# Patient Record
Sex: FEMALE | Race: WHITE | NOT HISPANIC OR LATINO | Employment: STUDENT | URBAN - METROPOLITAN AREA
[De-identification: names, ages, dates, MRNs, and addresses within clinical notes are randomized per-mention and may not be internally consistent; named-entity substitution may affect disease eponyms.]

---

## 2019-03-22 ENCOUNTER — HOSPITAL ENCOUNTER (EMERGENCY)
Facility: OTHER | Age: 19
Discharge: HOME | End: 2019-03-23
Attending: EMERGENCY MEDICINE

## 2019-03-22 VITALS
WEIGHT: 130 LBS | SYSTOLIC BLOOD PRESSURE: 123 MMHG | DIASTOLIC BLOOD PRESSURE: 66 MMHG | RESPIRATION RATE: 20 BRPM | BODY MASS INDEX: 20.4 KG/M2 | HEART RATE: 100 BPM | TEMPERATURE: 98 F | OXYGEN SATURATION: 98 % | HEIGHT: 67 IN

## 2019-03-22 DIAGNOSIS — F16.920: Primary | ICD-10-CM

## 2019-03-22 PROCEDURE — 25000003 PHARM REV CODE 250: Performed by: EMERGENCY MEDICINE

## 2019-03-22 PROCEDURE — 99282 EMERGENCY DEPT VISIT SF MDM: CPT

## 2019-03-22 RX ORDER — ONDANSETRON 4 MG/1
4 TABLET, ORALLY DISINTEGRATING ORAL
Status: COMPLETED | OUTPATIENT
Start: 2019-03-22 | End: 2019-03-22

## 2019-03-22 RX ADMIN — ONDANSETRON 4 MG: 4 TABLET, ORALLY DISINTEGRATING ORAL at 10:03

## 2019-03-23 NOTE — ED PROVIDER NOTES
"Encounter Date: 3/22/2019    SCRIBE #1 NOTE: I, Tony Bowden, am scribing for, and in the presence of, Dr. Helton.       History     Chief Complaint   Patient presents with    Drug Problem     Pt came to the ED tonight s/p taking LSD , pt was found unconscious by EMS on scene. pt is now aaox4 but currently hallucinating.      Time seen by provider: 10:35 PM    This is a 18 y.o. female who presents with complaint of altered mental status and loss of consciousness prior to arrival. Upon EMS arrival the patient was awake and alert. The patient's friend reports that she consumed alcohol and two tabs of LSD, while at a music festival. The patient initially complaint of hallucinations and a episode of emesis while in the ED. Her symptoms have since improved.       The history is provided by the EMS personnel and a friend.     Review of patient's allergies indicates:  No Known Allergies  History reviewed. No pertinent past medical history.  No past surgical history on file.  History reviewed. No pertinent family history.  Social History     Tobacco Use    Smoking status: Not on file   Substance Use Topics    Alcohol use: Not on file    Drug use: Not on file     ROS: As per HPI and below:   General: No fever.  HENT: No sore throat.    Eyes: No eye pain.   Cardiovascular: No chest pain.   Respiratory: No dyspnea.  GI: Emesis.  Nausea, vomiting.  No abdominal pain.    Neuro: No focal weakness. No headache. No syncope.  Musculoskeletal: No extremity pain. No swelling.    Psych: Hallucinations. No acute changes.  All other systems negative.       Physical Exam     Initial Vitals [03/22/19 2211]   BP Pulse Resp Temp SpO2   132/75 96 18 98.2 °F (36.8 °C) 100 %      MAP       --         Nursing note and vitals reviewed.  /66   Pulse 100   Temp 98.2 °F (36.8 °C) (Oral)   Resp 20   Ht 5' 7" (1.702 m)   Wt 59 kg (130 lb)   SpO2 98%   Breastfeeding? No   BMI 20.36 kg/m²   Constitutional: AAOx3. Well-developed and " well-nourished. No distress. Glitter face make-up. Bright colored body suit and fishnet top.   HENT:   Mouth/Throat: Oropharynx is clear and moist.  Eyes: EOMI. No discharge. Anicteric.  Neck: Normal range of motion. Neck supple.  Cardiovascular: Normal rate. No murmur, no gallop and no friction rub heard.   Pulmonary/Chest: No respiratory distress. Effort normal. No wheezes, no rales, no rhonchi  Abdominal: Bowel sounds normal. Soft. No distension and no mass. There is no tenderness. There is no rebound, no guarding, no tenderness at McBurney's point and negative Dent's sign.   Musculoskeletal: Normal range of motion.   Neurological: GCS 15. Alert and oriented to person, place, and time. No gross cranial nerve, light touch or strength deficit. Coordination normal.   Skin: Skin is warm and dry.   Psychiatric: Behavior is normal. Judgment normal. Not responding to internal stimuli, or having obvious hallucinations.      ED Course   Procedures  Labs Reviewed   POCT URINE PREGNANCY          Imaging Results    None          Medical Decision Making:   ED Management:  11:57 PM: Patient is able to tolerate oral intake. She denies any symptoms of intoxication. The patient states that LSD use was approximately six hours ago, which is consistent with the drugs affects.             Scribe Attestation:   Scribe #1: I performed the above scribed service and the documentation accurately describes the services I performed. I attest to the accuracy of the note.    Attending Attestation:           Physician Attestation for Scribe:  Physician Attestation Statement for Scribe #1: I, Dr. Helton, reviewed documentation, as scribed by Tony Bowden  in my presence, and it is both accurate and complete.                 ED Course as of Mar 23 0020   Fri Mar 22, 2019   2243 Patient is a 18-year-old female presents with altered mental status in the setting of taking 2 tabs of LSD, and alcohol intake while attending a music festival.   Patient had active emesis in the emergency department, and is acting appropriately.    [RC]   Sat Mar 23, 2019   0002 I discussed with patient and/or guardian/caretaker that this evaluation in the ED does not suggest any emergent or life threatening medical condition requiring admission or immediate intervention beyond what was provided in the ED. Regardless, an unremarkable evaluation in the ED does not preclude the development or presence of a serious or life threatening condition. As such, patient was instructed to return immediately for any worsening or change in current symptoms.     I had a detailed discussion with patient  and/or guardian/caretaker regarding findings, plan, return precautions, importance of medication adherence, need to follow-up with a PCP. All questions answered.     Note was created using voice recognition software. Note may have occasional typographical errors that may not have been identified and edited despite initial review prior to signing.    [RC]      ED Course User Index  [RC] Vladimir Helton MD     Clinical Impression:     1. Hallucinogen intoxication without complication                                 Vladimir Helton MD  03/23/19 0020

## 2019-03-23 NOTE — ED TRIAGE NOTES
Pt came to the ED tonight s/p having too much LSD tonight at the [id number] [place] festival . Pt was found unconscious per EMS and then was woken up with no intervention. Pt is aaox4 but is currently hallucinating. Pt has some episodes of vomiting but currently has no new complaints pt will be monitored at this time

## 2019-03-23 NOTE — DISCHARGE INSTRUCTIONS
Call your primary care doctor to make the first available appointment.     Keep all your medical appointments.     Take your regular medication as prescribed. Contact your primary care provider before running out of medication, or for any problems obtaining them.    Do not drive or operate heavy machinery while taking opioid or muscle relaxing medications. Examples include norco, percocet, xanax, valium, flexeril.     Overuse or long term use of pain and sedating medication may lead to addiction, dependence, organ failure, family and work problems, legal problems, accidental overdose and death.    If you do not have health insurance, you probably qualify for heavily discounted rates:  Call 1-175.383.4936 ([facility] hotline) or go to www.[employer].[place].gov    Your evaluation in the ED does not suggest any emergent or life threatening medical condition requiring admission or immediate intervention beyond that provided in the ED.   However, the signs of a serious problem sometimes take more time to appear.   RETURN TO THE ER if any of the following occur:    Weakness, dizziness, fainting, or loss of consciousness   Fever of 100.4ºF (38ºC) or higher  Any worse symptoms  Any new or concerning symptoms